# Patient Record
Sex: FEMALE | URBAN - NONMETROPOLITAN AREA
[De-identification: names, ages, dates, MRNs, and addresses within clinical notes are randomized per-mention and may not be internally consistent; named-entity substitution may affect disease eponyms.]

---

## 2023-01-12 ENCOUNTER — OFFICE VISIT (OUTPATIENT)
Dept: URBAN - NONMETROPOLITAN AREA CLINIC 1 | Facility: CLINIC | Age: 38
End: 2023-01-12
Payer: COMMERCIAL

## 2023-01-12 DIAGNOSIS — G51.0 BELL'S PALSY: ICD-10-CM

## 2023-01-12 DIAGNOSIS — H04.123 DRY EYE SYNDROME OF BILATERAL LACRIMAL GLANDS: Primary | ICD-10-CM

## 2023-01-12 PROCEDURE — 99204 OFFICE O/P NEW MOD 45 MIN: CPT | Performed by: OPTOMETRIST

## 2023-01-12 ASSESSMENT — KERATOMETRY
OS: 46.25
OD: 47.25

## 2023-01-12 ASSESSMENT — INTRAOCULAR PRESSURE
OD: 14
OS: 15

## 2023-01-12 ASSESSMENT — VISUAL ACUITY
OD: 20/25
OS: 20/25

## 2023-01-12 NOTE — IMPRESSION/PLAN
Impression: Dry eye syndrome of bilateral lacrimal glands: H04.123. Plan: Dry eyes account for the patient's complaints. Discussed artificial tears, gels, prescription drops, for the management of the dry eye. Recommended artificial tears PRN OU.

## 2024-08-29 ENCOUNTER — OFFICE VISIT (OUTPATIENT)
Dept: URBAN - NONMETROPOLITAN AREA CLINIC 1 | Facility: CLINIC | Age: 39
End: 2024-08-29

## 2024-08-29 DIAGNOSIS — H52.03 HYPERMETROPIA, BILATERAL: Primary | ICD-10-CM

## 2024-08-29 DIAGNOSIS — Z13.5 ENCOUNTER FOR SCREENING FOR EYE AND EAR DISORDERS: ICD-10-CM

## 2024-08-29 PROCEDURE — 92012 INTRM OPH EXAM EST PATIENT: CPT | Performed by: OPTOMETRIST

## 2024-08-29 ASSESSMENT — INTRAOCULAR PRESSURE
OS: 15
OD: 17

## 2024-08-29 ASSESSMENT — KERATOMETRY
OD: 2298.38
OS: 46.38

## 2024-08-29 ASSESSMENT — VISUAL ACUITY
OD: 20/40
OS: 20/40